# Patient Record
Sex: MALE | Race: WHITE | NOT HISPANIC OR LATINO | URBAN - METROPOLITAN AREA
[De-identification: names, ages, dates, MRNs, and addresses within clinical notes are randomized per-mention and may not be internally consistent; named-entity substitution may affect disease eponyms.]

---

## 2018-01-01 ENCOUNTER — EMERGENCY (EMERGENCY)
Facility: HOSPITAL | Age: 73
LOS: 1 days | End: 2018-01-01
Attending: EMERGENCY MEDICINE
Payer: MEDICARE

## 2018-01-01 VITALS — TEMPERATURE: 97 F

## 2018-01-01 PROCEDURE — 99285 EMERGENCY DEPT VISIT HI MDM: CPT

## 2018-01-01 PROCEDURE — 82962 GLUCOSE BLOOD TEST: CPT

## 2018-09-26 NOTE — ED PROVIDER NOTE - PMH
HTN - Hypertension    Hypercholesterolemia    Myocardial Infarction  2003  Prostate Cancer  2002 ; tx with seed implant  Skin Cancer of Arm  facial ; pt unsure type ; s/p mohs procedure  Transitional Cell Carcinoma

## 2018-09-26 NOTE — ED ADULT NURSE NOTE - CHIEF COMPLAINT QUOTE
Pt arrives on thumper ACLS established and 5 epis on board. As per EMS, pt was home with wife when wife heard pt having difficulty breathing and then heard a loud bang, wife found pt on floor and started CPR.

## 2018-09-26 NOTE — ED PROVIDER NOTE - PSH
Bilateral Inguinal Hernia Repair    Cystoscopy, Bilateral Retrograde Pyelogram, Attempted Right Ureteroscoy Right Double J Stent  4/01/09  Insertion Radiactive Seeds Prostate  2002  Mohs Procedure  Facial / Arm secondary to skin cancer ; pt unsure site  Right Cystouretheroscopy, Right Retrograde Pyelogram , RIght  Flex Ureteroscopy,Bladder Biopsy  Fulgaration Bladder Biopsy Site, Percutaneous Access Right Kidney, Right Nephrostogram, Right  Percutaneous Tumor Resection Ureteropelvic Junction Right Ureteral Stent Laser Fulgaration Resection Site Right Percutaneous Nephrostomy Tube Placement 11/26/08  Right Ureteral Stent Removal, Right Retrograde Pyelogram, Right Ureteroscopy  4/08/09

## 2018-09-26 NOTE — ED PROVIDER NOTE - OBJECTIVE STATEMENT
pt presents in cpa down  45 minutes with acls 5 epi and no rosc witnessed arrest by family . pt presents in cpa

## 2018-09-26 NOTE — ED ADULT TRIAGE NOTE - CHIEF COMPLAINT QUOTE
Pt arrives in asystole intubated 7.5 tube and 5 epis on board. As per EMS, pt was home with wife when wife heard pt having difficulty breathing and then heard a loud bang, wife found pt on floor and started CPR. Pt arrives on thumper ACLS established and 5 epis on board. As per EMS, pt was home with wife when wife heard pt having difficulty breathing and then heard a loud bang, wife found pt on floor and started CPR.

## 2022-05-16 NOTE — ED PROVIDER NOTE - PRINCIPAL DIAGNOSIS
Patient is identified as having Diastolic (HFpEF) heart failure that is Chronic. CHF is currently controlled. Latest ECHO performed and demonstrates- Results for orders placed during the hospital encounter of 04/08/22    Echo    Interpretation Summary  · The left ventricle is normal in size with concentric hypertrophy and normal systolic function.  · The estimated ejection fraction is 65%.  · Grade I left ventricular diastolic dysfunction.  · Normal right ventricular size with normal right ventricular systolic function.  · The estimated PA systolic pressure is 42 mmHg.  · Mild aortic regurgitation.  . Continue ACE/ARB and monitor clinical status closely. Monitor on telemetry. Patient is off CHF pathway.  Monitor strict Is&Os and daily weights.  Place on fluid restriction of 1 L. Continue to stress to patient importance of self efficacy and  on diet for CHF. Last BNP reviewed- and noted below   Recent Labs   Lab 05/15/22  0615   BNP 90   .    Cont IV lasix PRN   Monitor UOP and kidney function    Cardiopulmonary arrest